# Patient Record
Sex: FEMALE | Race: OTHER | HISPANIC OR LATINO | URBAN - METROPOLITAN AREA
[De-identification: names, ages, dates, MRNs, and addresses within clinical notes are randomized per-mention and may not be internally consistent; named-entity substitution may affect disease eponyms.]

---

## 2022-03-09 ENCOUNTER — EMERGENCY (EMERGENCY)
Facility: HOSPITAL | Age: 32
LOS: 1 days | Discharge: HOME | End: 2022-03-09
Attending: EMERGENCY MEDICINE | Admitting: EMERGENCY MEDICINE
Payer: MEDICAID

## 2022-03-09 VITALS
SYSTOLIC BLOOD PRESSURE: 116 MMHG | RESPIRATION RATE: 18 BRPM | DIASTOLIC BLOOD PRESSURE: 79 MMHG | OXYGEN SATURATION: 99 % | WEIGHT: 163.8 LBS | HEART RATE: 96 BPM | HEIGHT: 64 IN | TEMPERATURE: 98 F

## 2022-03-09 PROCEDURE — 99283 EMERGENCY DEPT VISIT LOW MDM: CPT

## 2022-03-09 PROCEDURE — 99282 EMERGENCY DEPT VISIT SF MDM: CPT

## 2022-03-09 NOTE — ED ADULT NURSE NOTE - OBJECTIVE STATEMENT
31 yo female presents to the ED to remove her IUD as her obgyn sent her here stating IUD is missing a string.

## 2022-03-09 NOTE — ED PROVIDER NOTE - ATTENDING CONTRIBUTION TO CARE
32 y.o. F states she was sent to ED by her GYN PA for IUD removal, states was routine removal, but PA was unable to see the IUD, states they did an US in the office and it did appear in place. pt without abd pain, no n/v, no fever, no bleeding, no dc. states the PA at the GYN advised she come to the ED as risk of bleeding with removal; on exam pt is wd, wn, NAD; A/P d/w patient that there is no emergent need for IUD removal at this time, should f/u with her GYN physician, if needed the removal can be scheduled as an outpt if requires sedation, will also provide referral to another GYN if pt's GYN unable to provide IUD removal, d/w pt urgent s/s to watch for to return to ED

## 2022-03-09 NOTE — ED PROVIDER NOTE - CARE PROVIDER_API CALL
BEHAR, MARC  Obstetrics & Gynecology  [address] [facility] [address]  [place] [address]  Phone: [phone]  Fax: ()-  Follow Up Time: 1-3 Days    Eugene Álvarez)  Obstetrics and Gynecology  [address] [place] [address]  Phone: [phone]  Fax: [phone]  Follow Up Time: 1-3 Days

## 2022-03-09 NOTE — ED PROVIDER NOTE - OBJECTIVE STATEMENT
32 year old female presents "to have her IUD string removed". no pain or complaints. was seen by new ob today to have regular pap smear. has had IUD in for past 7 years (placed by different ob). has been having some irregular menstrual cycles past year and mentioned at appt today. was recommended to have IUD out. was seen by PA in ob office. was told by PA that she could not locate the string on exam. patient reports she can feel the string when she showers. had US in office which showed IUD in place. was told to come to ED by the PA to have the IUD removed "in case she bled". patient denies any current bleeding or pelvic pain   PCP Justin Mims   ob-gyn marc Behar

## 2022-03-09 NOTE — ED PROVIDER NOTE - PROVIDER TOKENS
PROVIDER:[TOKEN:[[id number]],FOLLOWUP:[1-3 Days]],PROVIDER:[TOKEN:[[id number]],FOLLOWUP:[1-3 Days]]

## 2022-03-09 NOTE — ED PROVIDER NOTE - NSFOLLOWUPINSTRUCTIONS_ED_ALL_ED_FT
follow up with your own ob-gyn for IUD removal  referral to additional ob-gyn provided if needed       Intrauterine Device Information       An intrauterine device (IUD) is a medical device that is inserted into the uterus to prevent pregnancy. It is a small, T-shaped device that has one or two nylon strings hanging down from it. The strings hang out of the lower part of the uterus (cervix) to allow for future IUD removal. There are two types of IUDs:  •Hormone IUD. This type of IUD is made of plastic and contains the hormone progestin (synthetic progesterone). A hormone IUD may last 3–5 years.      •Copper IUD. This type of IUD has copper wire wrapped around it. A copper IUD may last up to 10 years.        How is an IUD inserted?    An IUD is inserted through the vagina, through the cervix, and into the uterus with a minor medical procedure. The procedure for IUD insertion may vary among health care providers and hospitals.      How does an IUD work?    Synthetic progesterone in a hormonal IUD prevents pregnancy by:  •Thickening cervical mucus to prevent sperm from entering the uterus.      •Thinning the uterine lining to prevent a fertilized egg from being implanted there.      Copper in a copper IUD prevents pregnancy by making the uterus and fallopian tubes produce a fluid that kills sperm.      What are the advantages of an IUD?    Advantages of either type of IUD     An IUD:  •Is highly effective in preventing pregnancy.      •Is reversible. You can become pregnant shortly after the IUD is removed.      •Is low-maintenance and can stay in place for a long time.      •Has no estrogen-related side effects.      •Can be used when breastfeeding.      •Is not associated with weight gain.      •Can be inserted right after childbirth, an , or a miscarriage.      Advantages of a hormone IUD    •If it is inserted within 7 days of your period starting, it works right after it has been inserted. If the hormone IUD is inserted at any other time in your cycle, you will need to use a backup method of birth control for 7 days after insertion.      •It can make menstrual periods lighter or stop completely.      •It can reduce menstrual cramping and other discomforts from menstrual periods.      •It can be used for 3–5 years, depending on which IUD you have.      Advantages of a copper IUD    •It works right after it is inserted.      •It can be used as a form of emergency birth control if it is inserted within 5 days after having unprotected sex.      •It does not interfere with your body's natural hormones.      •It can be used for up to 10 years.        What are the disadvantages of an IUD?    •An IUD may cause irregular menstrual bleeding for a period of time after insertion.      •It is common to have pain during insertion and have cramping and vaginal bleeding after insertion.      •An IUD may cut the uterus (uterine perforation) when it is inserted. This is rare.      •Pelvic inflammatory disease (PID) may happen after insertion of an IUD. PID is an infection in the uterus and fallopian tubes. The IUD does not cause the infection. The infection is usually from an unknown sexually transmitted infection (STI). This is rare, and it usually happens during the first 20 days after the IUD is inserted.      •A copper IUD can make your menstrual flow heavier and more painful.      •IUDs cannot prevent sexually transmitted infections (STIs).        How is an IUD removed?     •You will lie on your back with your knees bent and your feet in footrests (stirrups).      •A device will be inserted into your vagina to spread apart the vaginal walls (speculum). This will allow your health care provider to see the strings attached to the IUD.      •Your health care provider will use a small instrument (forceps) to grasp the IUD strings and will pull firmly until the IUD is removed.      You may have some discomfort when the IUD is removed. Your health care provider may recommend taking over-the-counter pain relievers, such as ibuprofen, before the procedure. You may also have minor spotting for a few days after the procedure.    The procedure for IUD removal may vary among health care providers and hospitals.      Is an IUD right for me?    If you are interested in an IUD, discuss it with your health care provider. He or she will make sure you are a good candidate for an IUD and will let you know more about the advantages, disadvantage, and possible side effects. This will allow you to make a decision about the device.      Summary    •An intrauterine device (IUD) is a medical device that is inserted in the uterus to prevent pregnancy. It is a small, T-shaped device that has one or two nylon strings hanging down from it.      •A hormone IUD contains the hormone progestin (synthetic progesterone). A copper IUD has copper wire wrapped around it.      •Synthetic progesterone in a hormone IUD prevents pregnancy by thickening cervical mucus and thinning the walls of the uterus. Copper in a copper IUD prevents pregnancy by making the uterus and fallopian tubes produce a fluid that kills sperm.      •A hormone IUD can be left in place for 3–5 years. A copper IUD can be left in place for up to 10 years.      •An IUD is inserted and removed by a health care provider. You may feel some pain during insertion and removal. Your health care provider may recommend taking over-the-counter pain medicine, such as ibuprofen, before an IUD procedure.      This information is not intended to replace advice given to you by your health care provider. Make sure you discuss any questions you have with your health care provider.

## 2022-03-09 NOTE — ED ADULT TRIAGE NOTE - HEART RATE (BEATS/MIN)
96 [Follow-Up] : a follow-up visit for [Tetralogy Of Fallot] : Tetralogy of Fallot [Pulmonary Stenosis] : pulmonary stenosis [Pulmonary Valve Insufficiency] : pulmonary valve insufficiency [Mother] : mother

## 2022-03-09 NOTE — ED PROVIDER NOTE - CLINICAL SUMMARY MEDICAL DECISION MAKING FREE TEXT BOX
was sent to ED for IUD removal by PA in ob-gyn office. provider was unable to locate IUD string (but IUD was visualized on US and in place per patient). no indication for emergent removal. needs removal by ob-gyn. this was discussed with patient and verbalized understanding. will call her own ob-gyn tomorrow. will also provide on call ob-gyn information

## 2022-03-09 NOTE — ED PROVIDER NOTE - PATIENT PORTAL LINK FT
You can access the FollowMyHealth Patient Portal offered by [facility] by registering at the following website: [email or web address]. By joining [facility]’s FollowMyHealth portal, you will also be able to view your health information using other applications (apps) compatible with our system.

## 2024-12-04 NOTE — ED ADULT NURSE NOTE - STOOL PATTERN
There are no Wet Read(s) to document.
Orlando Lucas (DO), Surgery  [address] [facility]  [address] [place] [address]  Phone: [phone]  Fax: [phone]
normal patient pattern